# Patient Record
(demographics unavailable — no encounter records)

---

## 2025-05-03 NOTE — HISTORY OF PRESENT ILLNESS
[de-identified] : bili check [FreeTextEntry6] : Feeding every hour and on demand  Latching well for about 15 min and also taking EBM about 1 oz each feed Voiding and elimination appropriate Appears "less yellow"  No new concerns

## 2025-05-03 NOTE — DISCUSSION/SUMMARY
[FreeTextEntry1] : 3 day old male born FT at 39 weeks here for bili and weight check Up 80 g since yesterday, still down 4% from BW Feeding and voiding well, exclusive breastmilk TCB in office today 13.1, below TSB threshold of 15 Phototherapy threshold @ 73 HOL is 19.6 Return precautions discussed Has follow up scheduled for 05/08 May return sooner with new or worsening concerns

## 2025-05-03 NOTE — PHYSICAL EXAM
[NL] : regular rate and rhythm, normal S1, S2 audible, no murmurs [de-identified] : Jaundice to upper chest, mild scleral icterus

## 2025-05-04 NOTE — PHYSICAL EXAM
[Alert] : alert [Normocephalic] : normocephalic [Flat Open Anterior [place]] : flat open anterior fontanelle [Flat Open Posterior [place]] : flat open posterior fontanelle [Icteric sclera] : icteric sclera [PERRL] : PERRL [Red Reflex Bilateral] : red reflex bilateral [Normally Placed Ears] : normally placed ears [Auricles Well Formed] : auricles well formed [Patent Auditory Canal] : patent auditory canal [Nares Patent] : nares patent [Palate Intact] : palate intact [Uvula Midline] : uvula midline [Supple, full passive range of motion] : supple, full passive range of motion [Symmetric Chest Rise] : symmetric chest rise [Clear to Auscultation Bilaterally] : clear to auscultation bilaterally [Regular Rate and Rhythm] : regular rate and rhythm [S1, S2 present] : S1, S2 present [+2 Femoral Pulses] : +2 femoral pulses [Soft] : soft [Bowel Sounds] : bowel sounds present [Umbilical Stump Dry, Clean, Intact] : umbilical stump dry, clean, intact [Normal external genitailia] : normal external genitalia [Central Urethral Opening] : central urethral opening [Patent] : patent [Normally Placed] : normally placed [Symmetric Flexed Extremities] : symmetric flexed extremities [Startle Reflex] : startle reflex present [Suck Reflex] : suck reflex present [Rooting] : rooting reflex present [Palmar Grasp] : palmar grasp present [Plantar Grasp] : plantar reflex present [Symmetric Justino] : symmetric [place] [Jaundice] : jaundice [Dermal Melanocytosis] : Dermal Melanocytosis [Acute Distress] : no acute distress [Caput Succedaneum] : no caput succedaneum [Cephalohematoma] : no cephalohematoma [Discharge] : no discharge [Murmurs] : no murmurs [Tender] : nontender [Distended] : not distended [Circumcised] : not circumcised [Clavicular Crepitus] : no clavicular crepitus [Courtney-Ortolani] : negative Courtney-Ortolani [Spinal Dimple] : no spinal dimple [Tuft of Hair] : no tuft of hair [FreeTextEntry1] : well-appearing [FreeTextEntry2] : multiple mild excoriations on face [FreeTextEntry6] : b/l testes high-riding but palpated [de-identified] : hyperpigmentation of buttocks

## 2025-05-04 NOTE — HISTORY OF PRESENT ILLNESS
[Born at ___ Wks Gestation] : The patient was born at [unfilled] weeks gestation [BW: _____] : weight of [unfilled] [Length: _____] : length of [unfilled] [Time of Birth: _____] : Time of birth was [unfilled] [] : via normal spontaneous vaginal delivery [(1) _____] : [unfilled] [(5) _____] : [unfilled] [Meconium] : meconium [Nuchal Cord] : nuchal cord [DW: _____] : Discharge weight was [unfilled] [Rubella (Immune)] : Rubella immune [MBT: ____] : MBT - [unfilled] [PIH] : TOM [Breast milk] : breast milk [Formula ___ oz/feed] : [unfilled] oz of formula per feed [Formula ___ oz in 24hrs] : [unfilled] oz of formula in 24 hours [Hours between feeds ___] : Child is fed every [unfilled] hours [Normal] : Normal [___ voids per day] : [unfilled] voids per day [Frequency of stools: ___] : Frequency of stools: [unfilled]  stools [per day] : per day. [Dark green] : dark green [Green/brown] : green/brown [Seedy] : seedy [In Bassinet/Crib] : sleeps in bassinet/crib [On back] : sleeps on back [Pacifier] : Uses pacifier [No] : No cigarette smoke exposure [Rear facing car seat in back seat] : Rear facing car seat in back seat [Carbon Monoxide Detectors] : Carbon monoxide detectors at home [Smoke Detectors] : Smoke detectors at home. [Hepatitis B Vaccine Given] : Hepatitis B vaccine given [HC: _____] : head circumference of [unfilled] [Age: ___] : [unfilled] year old mother [G: ___] : G [unfilled] [P: ___] : P [unfilled] [HepBsAG] : HepBsAg negative [HIV] : HIV negative [HepC] : Hepatitis C negative [GBS] : GBS negative [VDRL/RPR (Reactive)] : VDRL/RPR nonreactive [TsB: _____] : Total Serum Bilirubin [unfilled] mg/dL [] : Circumcision: No [Vitamins ___] : Patient takes no vitamins [Co-sleeping] : no co-sleeping [Loose bedding, pillow, toys, and/or bumpers in crib] : no loose bedding, pillow, toys, and/or bumpers in crib [Exposure to electronic nicotine delivery system] : No exposure to electronic nicotine delivery system [Nirsevimab Given] : Nirsevimab not given [FreeTextEntry7] : discharged from hosp on 5/1 at 5pm [de-identified] : mother plans to breast feed exclusively  [de-identified] : baby dislikes pacifier [FreeTextEntry9] : asleep throughout the day, awake at night [de-identified] : lives with parents, 2 year old brother, grandparents [FreeTextEntry1] :  Hospital Course 39.2 wk AGA male born to a 32 y/o  mother via . Peds called to delivery for category II tracing and light meconium. Prenatal hx of maternal fall on , followed by MFM with no concerns. Maternal blood type B+. PNL HIV negative, HepB negative, HepC negative, RPR non-reactive, and Rubella immune. GBS negative on 2025. ROM with light meconium (23 min PTD). Nuchal cord x 2. Precipitous delivery. Baby born vigorous and crying. Apgars 8/9.   FH: Father had MI at age of 37 (required 2 stents placement) in 2025, subsequently diagnosed with high cholesterol

## 2025-05-04 NOTE — DISCUSSION/SUMMARY
[No Elimination Concerns] : elimination [Continue Regimen] : feeding [Normal Sleep Pattern] : sleep [Term Infant] : term infant [Hepatitis B In Hospital] : Hepatitis B administered while in the hospital [No Vaccines] : no vaccines needed [Mother] : mother [Father] : father [FreeTextEntry1] :  2 day old ex-39 wk   No significant prenatal or  complications Primarily breast fed  Normal voiding and stooling for age Mild jaundice of face and body; no hyperbilirubinemia risk factors  TCB 17 @ 55 HOL, phototherapy threshold 17.5  Recommend exclusive breastfeeding, 8-12 feedings per day. Mother should continue prenatal vitamins and avoid alcohol. If formula is needed, recommend iron-fortified formula every 3 hrs. When in car, patient should be in rear-facing car seat in back seat. Air dry umbilical stump. Put baby to sleep on back, in own crib with no loose or soft bedding. Limit baby's exposure to others, especially those with fever or unknown vaccine status. - Routine  care reviewed  - Recommend OTC infant's Vit D supplement - Serum bilirubin testing  - Return in 4-5 days for weight check  Of note, FH of early MI presumably due to CAD (baby's father had MI requiring 2 stents placement at age of 37) Recommend father to discuss with his Cardiologist if children should undergo cardiac work-up

## 2025-05-04 NOTE — HISTORY OF PRESENT ILLNESS
[Born at ___ Wks Gestation] : The patient was born at [unfilled] weeks gestation [BW: _____] : weight of [unfilled] [Length: _____] : length of [unfilled] [Time of Birth: _____] : Time of birth was [unfilled] [] : via normal spontaneous vaginal delivery [(1) _____] : [unfilled] [(5) _____] : [unfilled] [Meconium] : meconium [Nuchal Cord] : nuchal cord [DW: _____] : Discharge weight was [unfilled] [Rubella (Immune)] : Rubella immune [MBT: ____] : MBT - [unfilled] [PIH] : TOM [Breast milk] : breast milk [Formula ___ oz/feed] : [unfilled] oz of formula per feed [Formula ___ oz in 24hrs] : [unfilled] oz of formula in 24 hours [Hours between feeds ___] : Child is fed every [unfilled] hours [Normal] : Normal [___ voids per day] : [unfilled] voids per day [Frequency of stools: ___] : Frequency of stools: [unfilled]  stools [per day] : per day. [Dark green] : dark green [Green/brown] : green/brown [Seedy] : seedy [In Bassinet/Crib] : sleeps in bassinet/crib [On back] : sleeps on back [Pacifier] : Uses pacifier [No] : No cigarette smoke exposure [Rear facing car seat in back seat] : Rear facing car seat in back seat [Carbon Monoxide Detectors] : Carbon monoxide detectors at home [Smoke Detectors] : Smoke detectors at home. [Hepatitis B Vaccine Given] : Hepatitis B vaccine given [HC: _____] : head circumference of [unfilled] [Age: ___] : [unfilled] year old mother [G: ___] : G [unfilled] [P: ___] : P [unfilled] [HepBsAG] : HepBsAg negative [HIV] : HIV negative [HepC] : Hepatitis C negative [GBS] : GBS negative [VDRL/RPR (Reactive)] : VDRL/RPR nonreactive [TsB: _____] : Total Serum Bilirubin [unfilled] mg/dL [] : Circumcision: No [Vitamins ___] : Patient takes no vitamins [Co-sleeping] : no co-sleeping [Loose bedding, pillow, toys, and/or bumpers in crib] : no loose bedding, pillow, toys, and/or bumpers in crib [Exposure to electronic nicotine delivery system] : No exposure to electronic nicotine delivery system [Nirsevimab Given] : Nirsevimab not given [FreeTextEntry7] : discharged from hosp on 5/1 at 5pm [de-identified] : mother plans to breast feed exclusively  [de-identified] : baby dislikes pacifier [FreeTextEntry9] : asleep throughout the day, awake at night [de-identified] : lives with parents, 2 year old brother, grandparents [FreeTextEntry1] :  Hospital Course 39.2 wk AGA male born to a 32 y/o  mother via . Peds called to delivery for category II tracing and light meconium. Prenatal hx of maternal fall on , followed by MFM with no concerns. Maternal blood type B+. PNL HIV negative, HepB negative, HepC negative, RPR non-reactive, and Rubella immune. GBS negative on 2025. ROM with light meconium (23 min PTD). Nuchal cord x 2. Precipitous delivery. Baby born vigorous and crying. Apgars 8/9.   FH: Father had MI at age of 37 (required 2 stents placement) in 2025, subsequently diagnosed with high cholesterol

## 2025-05-04 NOTE — REVIEW OF SYSTEMS
[Jaundice] : jaundice [Dry Skin] : dry skin [Negative] : Genitourinary [Spitting Up] : no spitting up [Rash] : no rash

## 2025-05-04 NOTE — PHYSICAL EXAM
[Alert] : alert [Normocephalic] : normocephalic [Flat Open Anterior [place]] : flat open anterior fontanelle [Flat Open Posterior [place]] : flat open posterior fontanelle [Icteric sclera] : icteric sclera [PERRL] : PERRL [Red Reflex Bilateral] : red reflex bilateral [Normally Placed Ears] : normally placed ears [Auricles Well Formed] : auricles well formed [Patent Auditory Canal] : patent auditory canal [Nares Patent] : nares patent [Palate Intact] : palate intact [Uvula Midline] : uvula midline [Supple, full passive range of motion] : supple, full passive range of motion [Symmetric Chest Rise] : symmetric chest rise [Clear to Auscultation Bilaterally] : clear to auscultation bilaterally [Regular Rate and Rhythm] : regular rate and rhythm [S1, S2 present] : S1, S2 present [+2 Femoral Pulses] : +2 femoral pulses [Soft] : soft [Bowel Sounds] : bowel sounds present [Umbilical Stump Dry, Clean, Intact] : umbilical stump dry, clean, intact [Normal external genitailia] : normal external genitalia [Central Urethral Opening] : central urethral opening [Patent] : patent [Normally Placed] : normally placed [Symmetric Flexed Extremities] : symmetric flexed extremities [Startle Reflex] : startle reflex present [Suck Reflex] : suck reflex present [Rooting] : rooting reflex present [Palmar Grasp] : palmar grasp present [Plantar Grasp] : plantar reflex present [Symmetric Justino] : symmetric [place] [Jaundice] : jaundice [Dermal Melanocytosis] : Dermal Melanocytosis [Acute Distress] : no acute distress [Caput Succedaneum] : no caput succedaneum [Cephalohematoma] : no cephalohematoma [Discharge] : no discharge [Murmurs] : no murmurs [Tender] : nontender [Distended] : not distended [Circumcised] : not circumcised [Clavicular Crepitus] : no clavicular crepitus [Courtney-Ortolani] : negative Courtney-Ortolani [Spinal Dimple] : no spinal dimple [Tuft of Hair] : no tuft of hair [FreeTextEntry1] : well-appearing [FreeTextEntry2] : multiple mild excoriations on face [FreeTextEntry6] : b/l testes high-riding but palpated [de-identified] : hyperpigmentation of buttocks

## 2025-05-12 NOTE — DISCUSSION/SUMMARY
[FreeTextEntry1] : 12 day old  growing and developing well  Routine  feeding and care reviewed.  Some sneezing, coughing, hiccups, mild nasal congestion can be normal.  anticipatory guidance provided.   A rectal temperature of 100.4 or higher would be extremely concerning and patient will need to go immediately to the ER for further assessment of care.   follow up in 2 weeks for 1 month well visit, sooner if needed.  All parent's questions answered

## 2025-05-12 NOTE — HISTORY OF PRESENT ILLNESS
[de-identified] : weight check [FreeTextEntry6] : Mother reports that patient is mostly breast fed every 2 hours.  2 oz from the bottle when he takes the bottle. stool is soft and seedy, urinating frequently.

## 2025-05-17 NOTE — PHYSICAL EXAM
[NL] : soft, nontender, nondistended, normal bowel sounds, no hepatosplenomegaly [de-identified] :  acne

## 2025-05-17 NOTE — HISTORY OF PRESENT ILLNESS
[de-identified] : rash [FreeTextEntry6] : otherwise healthy complanis of rash on face around eyes small bumps  otherwise ok eating well good uo